# Patient Record
Sex: MALE | Race: BLACK OR AFRICAN AMERICAN | NOT HISPANIC OR LATINO | Employment: FULL TIME | URBAN - METROPOLITAN AREA
[De-identification: names, ages, dates, MRNs, and addresses within clinical notes are randomized per-mention and may not be internally consistent; named-entity substitution may affect disease eponyms.]

---

## 2022-06-30 ENCOUNTER — OFFICE VISIT (OUTPATIENT)
Dept: FAMILY MEDICINE | Facility: CLINIC | Age: 32
End: 2022-06-30
Payer: COMMERCIAL

## 2022-06-30 VITALS
TEMPERATURE: 98 F | HEIGHT: 71 IN | DIASTOLIC BLOOD PRESSURE: 86 MMHG | SYSTOLIC BLOOD PRESSURE: 125 MMHG | WEIGHT: 207 LBS | HEART RATE: 63 BPM | OXYGEN SATURATION: 98 % | BODY MASS INDEX: 28.98 KG/M2 | RESPIRATION RATE: 20 BRPM

## 2022-06-30 DIAGNOSIS — D17.0 LIPOMA OF SCALP: ICD-10-CM

## 2022-06-30 DIAGNOSIS — Z76.89 ENCOUNTER TO ESTABLISH CARE: Primary | ICD-10-CM

## 2022-06-30 DIAGNOSIS — E66.3 OVERWEIGHT (BMI 25.0-29.9): ICD-10-CM

## 2022-06-30 PROBLEM — Z13.220 LIPID SCREENING: Status: RESOLVED | Noted: 2022-06-30 | Resolved: 2022-06-30

## 2022-06-30 PROBLEM — Z13.220 LIPID SCREENING: Status: ACTIVE | Noted: 2022-06-30

## 2022-06-30 PROCEDURE — 1160F PR REVIEW ALL MEDS BY PRESCRIBER/CLIN PHARMACIST DOCUMENTED: ICD-10-PCS | Mod: CPTII,,, | Performed by: FAMILY MEDICINE

## 2022-06-30 PROCEDURE — 3008F BODY MASS INDEX DOCD: CPT | Mod: CPTII,,, | Performed by: FAMILY MEDICINE

## 2022-06-30 PROCEDURE — 99202 OFFICE O/P NEW SF 15 MIN: CPT | Mod: ,,, | Performed by: FAMILY MEDICINE

## 2022-06-30 PROCEDURE — 1159F MED LIST DOCD IN RCRD: CPT | Mod: CPTII,,, | Performed by: FAMILY MEDICINE

## 2022-06-30 PROCEDURE — 99202 PR OFFICE/OUTPT VISIT, NEW, LEVL II, 15-29 MIN: ICD-10-PCS | Mod: ,,, | Performed by: FAMILY MEDICINE

## 2022-06-30 PROCEDURE — 1159F PR MEDICATION LIST DOCUMENTED IN MEDICAL RECORD: ICD-10-PCS | Mod: CPTII,,, | Performed by: FAMILY MEDICINE

## 2022-06-30 PROCEDURE — 3074F SYST BP LT 130 MM HG: CPT | Mod: CPTII,,, | Performed by: FAMILY MEDICINE

## 2022-06-30 PROCEDURE — 3074F PR MOST RECENT SYSTOLIC BLOOD PRESSURE < 130 MM HG: ICD-10-PCS | Mod: CPTII,,, | Performed by: FAMILY MEDICINE

## 2022-06-30 PROCEDURE — 3008F PR BODY MASS INDEX (BMI) DOCUMENTED: ICD-10-PCS | Mod: CPTII,,, | Performed by: FAMILY MEDICINE

## 2022-06-30 PROCEDURE — 3079F PR MOST RECENT DIASTOLIC BLOOD PRESSURE 80-89 MM HG: ICD-10-PCS | Mod: CPTII,,, | Performed by: FAMILY MEDICINE

## 2022-06-30 PROCEDURE — 3079F DIAST BP 80-89 MM HG: CPT | Mod: CPTII,,, | Performed by: FAMILY MEDICINE

## 2022-06-30 PROCEDURE — 1160F RVW MEDS BY RX/DR IN RCRD: CPT | Mod: CPTII,,, | Performed by: FAMILY MEDICINE

## 2022-06-30 NOTE — PROGRESS NOTES
"Subjective:      Patient ID: Wei Welch is a 32 y.o. male.    Chief Complaint: Establish Care (Requesting labs. )    31 yo AAM here to establish care. Denies acute complaints. Would like to discuss age-appropriate screening and surveillance.       Problem List Items Addressed This Visit     BMI 28.0-28.9,adult    Overweight (BMI 25.0-29.9)      Other Visit Diagnoses     Encounter to establish care    -  Primary    Lipoma of scalp        Relevant Orders    Ambulatory referral/consult to Dermatology          The patient's Health Maintenance was reviewed and the following appears to be due:   Health Maintenance Due   Topic Date Due    Hepatitis C Screening  Never done    Lipid Panel  Never done    HIV Screening  Never done    COVID-19 Vaccine (3 - Booster for Pfizer series) 02/15/2022       Past Medical History:  History reviewed. No pertinent past medical history.  History reviewed. No pertinent surgical history.  Review of patient's allergies indicates:  No Known Allergies  No current outpatient medications on file prior to visit.     No current facility-administered medications on file prior to visit.     Social History     Socioeconomic History    Marital status:     Number of children: 1   Occupational History    Occupation:    Tobacco Use    Smoking status: Never Smoker    Smokeless tobacco: Never Used   Substance and Sexual Activity    Alcohol use: Yes     Alcohol/week: 3.0 standard drinks     Types: 1 Glasses of wine, 1 Cans of beer, 1 Shots of liquor per week    Drug use: Yes     Types: Marijuana     Family History   Problem Relation Age of Onset    No Known Problems Mother     No Known Problems Father        Review of Systems   All other systems reviewed and are negative.      Objective:   /86 (BP Location: Right arm, Patient Position: Sitting, BP Method: X-Large (Automatic))   Pulse 63   Temp 98.1 °F (36.7 °C) (Oral)   Resp 20   Ht 5' 11" (1.803 m)   Wt 93.9 " kg (207 lb)   SpO2 98%   BMI 28.87 kg/m²     Physical Exam  Vitals and nursing note reviewed.   Constitutional:       General: He is awake.      Appearance: Normal appearance. He is well-developed, well-groomed and overweight.   HENT:      Head: Normocephalic and atraumatic.      Right Ear: Tympanic membrane, ear canal and external ear normal.      Left Ear: Tympanic membrane, ear canal and external ear normal.      Nose: Nose normal.      Mouth/Throat:      Mouth: Mucous membranes are moist.      Pharynx: Oropharynx is clear.   Eyes:      Extraocular Movements: Extraocular movements intact.      Conjunctiva/sclera: Conjunctivae normal.      Pupils: Pupils are equal, round, and reactive to light.   Cardiovascular:      Rate and Rhythm: Normal rate and regular rhythm.      Pulses: Normal pulses.      Heart sounds: Normal heart sounds.   Pulmonary:      Effort: Pulmonary effort is normal.      Breath sounds: Normal breath sounds.   Abdominal:      General: Abdomen is flat. Bowel sounds are normal.      Palpations: Abdomen is soft.   Musculoskeletal:         General: Normal range of motion.      Cervical back: Normal range of motion and neck supple.   Skin:     General: Skin is warm and dry.      Capillary Refill: Capillary refill takes 2 to 3 seconds.   Neurological:      General: No focal deficit present.      Mental Status: He is alert and oriented to person, place, and time. Mental status is at baseline.   Psychiatric:         Mood and Affect: Mood normal.         Behavior: Behavior normal. Behavior is cooperative.         Thought Content: Thought content normal.         Judgment: Judgment normal.         No visits with results within 6 Month(s) from this visit.   Latest known visit with results is:   No results found for any previous visit.       No image results found.       Assessment:     1. Encounter to establish care    2. BMI 28.0-28.9,adult    3. Overweight (BMI 25.0-29.9)    4. Lipoma of scalp      Plan:    Wei Welch does not currently have medications on file.  Problem List Items Addressed This Visit     BMI 28.0-28.9,adult    Overweight (BMI 25.0-29.9)      Other Visit Diagnoses     Encounter to establish care    -  Primary    Lipoma of scalp        Relevant Orders    Ambulatory referral/consult to Dermatology        Follow up in about 1 year (around 6/30/2023), or or PRN.    Wei was seen today for establish care.    Diagnoses and all orders for this visit:    Encounter to establish care  USPSTF guidelines advise bi-annual BP checks in otherwise stable, healthy and unsymptomatic patients.  In 2 years (@35), a lipid screen  Otherwise, he is up to date per USPSTF guidelines    BMI 28.0-28.9,adult  Documented for chart completeness and HCC  Weight loss efforts advised    Overweight (BMI 25.0-29.9)   Documented for chart completeness and HCC   Weight loss efforts advised             [unfilled]  Orders Placed This Encounter   Procedures    Ambulatory referral/consult to Dermatology     Standing Status:   Future     Standing Expiration Date:   7/30/2023     Referral Priority:   Routine     Referral Type:   Consultation     Referral Reason:   Specialty Services Required     Requested Specialty:   Dermatology     Number of Visits Requested:   1

## 2023-07-07 ENCOUNTER — LAB VISIT (OUTPATIENT)
Dept: LAB | Facility: HOSPITAL | Age: 33
End: 2023-07-07
Attending: NURSE PRACTITIONER
Payer: COMMERCIAL

## 2023-07-07 ENCOUNTER — OFFICE VISIT (OUTPATIENT)
Dept: FAMILY MEDICINE | Facility: CLINIC | Age: 33
End: 2023-07-07
Payer: COMMERCIAL

## 2023-07-07 VITALS
SYSTOLIC BLOOD PRESSURE: 135 MMHG | BODY MASS INDEX: 29.49 KG/M2 | HEART RATE: 50 BPM | TEMPERATURE: 97 F | OXYGEN SATURATION: 99 % | DIASTOLIC BLOOD PRESSURE: 89 MMHG | WEIGHT: 210.63 LBS | HEIGHT: 71 IN | RESPIRATION RATE: 20 BRPM

## 2023-07-07 DIAGNOSIS — Z00.00 WELLNESS EXAMINATION: ICD-10-CM

## 2023-07-07 DIAGNOSIS — Z00.00 WELLNESS EXAMINATION: Primary | ICD-10-CM

## 2023-07-07 DIAGNOSIS — E66.3 OVERWEIGHT (BMI 25.0-29.9): ICD-10-CM

## 2023-07-07 LAB
ABS NEUT CALC (OHS): 0.88 X10(3)/MCL (ref 2.1–9.2)
ALBUMIN SERPL-MCNC: 4.1 G/DL (ref 3.5–5)
ALBUMIN/GLOB SERPL: 1.2 RATIO (ref 1.1–2)
ALP SERPL-CCNC: 53 UNIT/L (ref 40–150)
ALT SERPL-CCNC: 18 UNIT/L (ref 0–55)
APPEARANCE UR: CLEAR
AST SERPL-CCNC: 17 UNIT/L (ref 5–34)
BILIRUB UR QL STRIP.AUTO: NEGATIVE MG/DL
BILIRUBIN DIRECT+TOT PNL SERPL-MCNC: 0.6 MG/DL
BUN SERPL-MCNC: 19.3 MG/DL (ref 8.9–20.6)
CALCIUM SERPL-MCNC: 9.8 MG/DL (ref 8.4–10.2)
CHLORIDE SERPL-SCNC: 106 MMOL/L (ref 98–107)
CHOLEST SERPL-MCNC: 161 MG/DL
CHOLEST/HDLC SERPL: 3 {RATIO} (ref 0–5)
CO2 SERPL-SCNC: 29 MMOL/L (ref 22–29)
COLOR UR: NORMAL
CREAT SERPL-MCNC: 1.24 MG/DL (ref 0.73–1.18)
EOSINOPHIL NFR BLD MANUAL: 0.27 X10(3)/MCL (ref 0–0.9)
EOSINOPHIL NFR BLD MANUAL: 10 % (ref 0–8)
ERYTHROCYTE [DISTWIDTH] IN BLOOD BY AUTOMATED COUNT: 11.8 % (ref 11.5–17)
EST. AVERAGE GLUCOSE BLD GHB EST-MCNC: 73.8 MG/DL
GFR SERPLBLD CREATININE-BSD FMLA CKD-EPI: >60 MLS/MIN/1.73/M2
GLOBULIN SER-MCNC: 3.4 GM/DL (ref 2.4–3.5)
GLUCOSE SERPL-MCNC: 89 MG/DL (ref 74–100)
GLUCOSE UR QL STRIP.AUTO: NEGATIVE MG/DL
HBA1C MFR BLD: 4.2 %
HCT VFR BLD AUTO: 44 % (ref 42–52)
HCV AB SERPL QL IA: NONREACTIVE
HDLC SERPL-MCNC: 50 MG/DL (ref 35–60)
HGB BLD-MCNC: 15.3 G/DL (ref 14–18)
HIV 1+2 AB+HIV1 P24 AG SERPL QL IA: NONREACTIVE
KETONES UR QL STRIP.AUTO: NEGATIVE MG/DL
LDLC SERPL CALC-MCNC: 91 MG/DL (ref 50–140)
LEUKOCYTE ESTERASE UR QL STRIP.AUTO: NEGATIVE UNIT/L
LYMPHOCYTES NFR BLD MANUAL: 1.39 X10(3)/MCL
LYMPHOCYTES NFR BLD MANUAL: 52 % (ref 13–40)
MCH RBC QN AUTO: 31 PG (ref 27–31)
MCHC RBC AUTO-ENTMCNC: 34.8 G/DL (ref 33–36)
MCV RBC AUTO: 89.2 FL (ref 80–94)
MONOCYTES NFR BLD MANUAL: 0.13 X10(3)/MCL (ref 0.1–1.3)
MONOCYTES NFR BLD MANUAL: 5 % (ref 2–11)
NEUTROPHILS NFR BLD MANUAL: 33 % (ref 47–80)
NITRITE UR QL STRIP.AUTO: NEGATIVE
NRBC BLD AUTO-RTO: 0 %
PH UR STRIP.AUTO: 6.5 [PH]
PLATELET # BLD AUTO: 148 X10(3)/MCL (ref 130–400)
PLATELET # BLD EST: NORMAL 10*3/UL
PMV BLD AUTO: 11.8 FL (ref 7.4–10.4)
POTASSIUM SERPL-SCNC: 4.6 MMOL/L (ref 3.5–5.1)
PROT SERPL-MCNC: 7.5 GM/DL (ref 6.4–8.3)
PROT UR QL STRIP.AUTO: NEGATIVE MG/DL
RBC # BLD AUTO: 4.93 X10(6)/MCL (ref 4.7–6.1)
RBC MORPH BLD: NORMAL
RBC UR QL AUTO: NEGATIVE UNIT/L
SODIUM SERPL-SCNC: 142 MMOL/L (ref 136–145)
SP GR UR STRIP.AUTO: 1.02
TRIGL SERPL-MCNC: 98 MG/DL (ref 34–140)
TSH SERPL-ACNC: 1.88 UIU/ML (ref 0.35–4.94)
UROBILINOGEN UR STRIP-ACNC: 1 MG/DL
VLDLC SERPL CALC-MCNC: 20 MG/DL
WBC # SPEC AUTO: 2.67 X10(3)/MCL (ref 4.5–11.5)

## 2023-07-07 PROCEDURE — 87389 HIV-1 AG W/HIV-1&-2 AB AG IA: CPT

## 2023-07-07 PROCEDURE — 36415 COLL VENOUS BLD VENIPUNCTURE: CPT

## 2023-07-07 PROCEDURE — 1159F PR MEDICATION LIST DOCUMENTED IN MEDICAL RECORD: ICD-10-PCS | Mod: CPTII,,, | Performed by: NURSE PRACTITIONER

## 2023-07-07 PROCEDURE — 99395 PR PREVENTIVE VISIT,EST,18-39: ICD-10-PCS | Mod: ,,, | Performed by: NURSE PRACTITIONER

## 2023-07-07 PROCEDURE — 3008F PR BODY MASS INDEX (BMI) DOCUMENTED: ICD-10-PCS | Mod: CPTII,,, | Performed by: NURSE PRACTITIONER

## 2023-07-07 PROCEDURE — 84443 ASSAY THYROID STIM HORMONE: CPT

## 2023-07-07 PROCEDURE — 80053 COMPREHEN METABOLIC PANEL: CPT

## 2023-07-07 PROCEDURE — 3075F SYST BP GE 130 - 139MM HG: CPT | Mod: CPTII,,, | Performed by: NURSE PRACTITIONER

## 2023-07-07 PROCEDURE — 1160F PR REVIEW ALL MEDS BY PRESCRIBER/CLIN PHARMACIST DOCUMENTED: ICD-10-PCS | Mod: CPTII,,, | Performed by: NURSE PRACTITIONER

## 2023-07-07 PROCEDURE — 80061 LIPID PANEL: CPT

## 2023-07-07 PROCEDURE — 1160F RVW MEDS BY RX/DR IN RCRD: CPT | Mod: CPTII,,, | Performed by: NURSE PRACTITIONER

## 2023-07-07 PROCEDURE — 3079F PR MOST RECENT DIASTOLIC BLOOD PRESSURE 80-89 MM HG: ICD-10-PCS | Mod: CPTII,,, | Performed by: NURSE PRACTITIONER

## 2023-07-07 PROCEDURE — 83036 HEMOGLOBIN GLYCOSYLATED A1C: CPT

## 2023-07-07 PROCEDURE — 3075F PR MOST RECENT SYSTOLIC BLOOD PRESS GE 130-139MM HG: ICD-10-PCS | Mod: CPTII,,, | Performed by: NURSE PRACTITIONER

## 2023-07-07 PROCEDURE — 99395 PREV VISIT EST AGE 18-39: CPT | Mod: ,,, | Performed by: NURSE PRACTITIONER

## 2023-07-07 PROCEDURE — 3079F DIAST BP 80-89 MM HG: CPT | Mod: CPTII,,, | Performed by: NURSE PRACTITIONER

## 2023-07-07 PROCEDURE — 3008F BODY MASS INDEX DOCD: CPT | Mod: CPTII,,, | Performed by: NURSE PRACTITIONER

## 2023-07-07 PROCEDURE — 85027 COMPLETE CBC AUTOMATED: CPT

## 2023-07-07 PROCEDURE — 1159F MED LIST DOCD IN RCRD: CPT | Mod: CPTII,,, | Performed by: NURSE PRACTITIONER

## 2023-07-07 PROCEDURE — 86803 HEPATITIS C AB TEST: CPT

## 2023-07-07 NOTE — PROGRESS NOTES
Subjective:      Patient ID: Wei Welch is a 33 y.o. Black or  male      Chief Complaint: 1 year f/u and Annual Exam       History reviewed. No pertinent past medical history.     HPI  Presents to clinic for Annual Wellness exam.  Denies any acute problems.    Labs due and ordered   Tdap up-to-date (2022)   Patient exercises 4-5 times a week for 1 hour per day  Do        Review of Systems   Constitutional:  Negative for chills, fatigue, fever and unexpected weight change.   HENT: Negative.     Eyes: Negative.    Respiratory: Negative.  Negative for shortness of breath.    Cardiovascular: Negative.  Negative for chest pain.   Gastrointestinal: Negative.    Endocrine: Negative.    Genitourinary: Negative.    Musculoskeletal: Negative.    Integumentary:  Negative.   Allergic/Immunologic: Negative.    Neurological: Negative.  Negative for weakness.   Hematological: Negative.    Psychiatric/Behavioral: Negative.     All other systems reviewed and are negative.       Objective:      Vitals:    07/07/23 0757   BP: 135/89   Pulse: (!) 50   Resp: 20   Temp: 97 °F (36.1 °C)      Body mass index is 29.37 kg/m².     Physical Exam  Vitals reviewed.   Constitutional:       Appearance: He is not toxic-appearing.   HENT:      Head: Normocephalic.      Mouth/Throat:      Mouth: Mucous membranes are moist.   Eyes:      Extraocular Movements: Extraocular movements intact.      Pupils: Pupils are equal, round, and reactive to light.   Cardiovascular:      Rate and Rhythm: Normal rate and regular rhythm.      Pulses: Normal pulses.      Heart sounds: Normal heart sounds.   Pulmonary:      Effort: Pulmonary effort is normal. No respiratory distress.      Breath sounds: Normal breath sounds.   Abdominal:      General: Bowel sounds are normal. There is no distension.      Palpations: Abdomen is soft.      Tenderness: There is no abdominal tenderness.   Musculoskeletal:         General: No tenderness. Normal range of  motion.      Cervical back: Neck supple.   Skin:     General: Skin is warm and dry.   Neurological:      Mental Status: He is alert and oriented to person, place, and time.   Psychiatric:         Mood and Affect: Mood normal.        No current outpatient medications on file.    Assessment & Plan:     Problem List Items Addressed This Visit          Endocrine    Overweight (BMI 25.0-29.9)     Instructed to continue aerobic exercises              Other    Wellness examination - Primary     Labs due and ordered   Tdap up-to-date (2022)   Instructed to continue aerobic exercises         Relevant Orders    CBC Auto Differential    Comprehensive Metabolic Panel    Lipid Panel    TSH    Hemoglobin A1C    Urinalysis    Hepatitis C Antibody    HIV 1/2 Ag/Ab (4th Gen)

## 2023-07-10 DIAGNOSIS — D72.819 LEUKOPENIA, UNSPECIFIED TYPE: Primary | ICD-10-CM

## 2023-10-09 PROBLEM — Z00.00 WELLNESS EXAMINATION: Status: RESOLVED | Noted: 2023-07-07 | Resolved: 2023-10-09

## 2024-07-09 ENCOUNTER — OFFICE VISIT (OUTPATIENT)
Dept: FAMILY MEDICINE | Facility: CLINIC | Age: 34
End: 2024-07-09
Payer: COMMERCIAL

## 2024-07-09 ENCOUNTER — LAB VISIT (OUTPATIENT)
Dept: LAB | Facility: HOSPITAL | Age: 34
End: 2024-07-09
Attending: FAMILY MEDICINE
Payer: COMMERCIAL

## 2024-07-09 VITALS
HEIGHT: 71 IN | DIASTOLIC BLOOD PRESSURE: 78 MMHG | SYSTOLIC BLOOD PRESSURE: 110 MMHG | OXYGEN SATURATION: 98 % | RESPIRATION RATE: 16 BRPM | HEART RATE: 63 BPM | WEIGHT: 198.5 LBS | TEMPERATURE: 98 F | BODY MASS INDEX: 27.79 KG/M2

## 2024-07-09 DIAGNOSIS — E66.3 OVERWEIGHT (BMI 25.0-29.9): ICD-10-CM

## 2024-07-09 DIAGNOSIS — Z00.00 WELLNESS EXAMINATION: Primary | ICD-10-CM

## 2024-07-09 DIAGNOSIS — R10.30 LOWER ABDOMINAL PAIN: ICD-10-CM

## 2024-07-09 DIAGNOSIS — Z00.00 WELLNESS EXAMINATION: ICD-10-CM

## 2024-07-09 DIAGNOSIS — D72.819 LEUKOPENIA, UNSPECIFIED TYPE: Primary | ICD-10-CM

## 2024-07-09 LAB
ABS NEUT CALC (OHS): 1 X10(3)/MCL (ref 2.1–9.2)
ALBUMIN SERPL-MCNC: 4.1 G/DL (ref 3.5–5)
ALBUMIN/GLOB SERPL: 1.2 RATIO (ref 1.1–2)
ALP SERPL-CCNC: 60 UNIT/L (ref 40–150)
ALT SERPL-CCNC: 17 UNIT/L (ref 0–55)
ANION GAP SERPL CALC-SCNC: 7 MEQ/L
AST SERPL-CCNC: 18 UNIT/L (ref 5–34)
BILIRUB SERPL-MCNC: 0.9 MG/DL
BILIRUB UR QL STRIP.AUTO: NEGATIVE
BUN SERPL-MCNC: 24.7 MG/DL (ref 8.9–20.6)
CALCIUM SERPL-MCNC: 9.6 MG/DL (ref 8.4–10.2)
CHLORIDE SERPL-SCNC: 109 MMOL/L (ref 98–107)
CHOLEST SERPL-MCNC: 172 MG/DL
CHOLEST/HDLC SERPL: 3 {RATIO} (ref 0–5)
CLARITY UR: CLEAR
CO2 SERPL-SCNC: 26 MMOL/L (ref 22–29)
COLOR UR AUTO: ABNORMAL
CREAT SERPL-MCNC: 1.04 MG/DL (ref 0.73–1.18)
CREAT/UREA NIT SERPL: 24
EOSINOPHIL NFR BLD MANUAL: 0.03 X10(3)/MCL (ref 0–0.9)
EOSINOPHIL NFR BLD MANUAL: 1 % (ref 0–8)
ERYTHROCYTE [DISTWIDTH] IN BLOOD BY AUTOMATED COUNT: 11.6 % (ref 11.5–17)
EST. AVERAGE GLUCOSE BLD GHB EST-MCNC: 79.6 MG/DL
GFR SERPLBLD CREATININE-BSD FMLA CKD-EPI: >60 ML/MIN/1.73/M2
GLOBULIN SER-MCNC: 3.5 GM/DL (ref 2.4–3.5)
GLUCOSE SERPL-MCNC: 88 MG/DL (ref 74–100)
GLUCOSE UR QL STRIP: NEGATIVE
HBA1C MFR BLD: 4.4 %
HCT VFR BLD AUTO: 43 % (ref 42–52)
HDLC SERPL-MCNC: 51 MG/DL (ref 35–60)
HGB BLD-MCNC: 14.7 G/DL (ref 14–18)
HGB UR QL STRIP: NEGATIVE
KETONES UR QL STRIP: NEGATIVE
LDLC SERPL CALC-MCNC: 107 MG/DL (ref 50–140)
LEUKOCYTE ESTERASE UR QL STRIP: NEGATIVE
LYMPHOCYTES NFR BLD MANUAL: 1.44 X10(3)/MCL
LYMPHOCYTES NFR BLD MANUAL: 53 % (ref 13–40)
MCH RBC QN AUTO: 30.3 PG (ref 27–31)
MCHC RBC AUTO-ENTMCNC: 34.2 G/DL (ref 33–36)
MCV RBC AUTO: 88.7 FL (ref 80–94)
MONOCYTES NFR BLD MANUAL: 0.24 X10(3)/MCL (ref 0.1–1.3)
MONOCYTES NFR BLD MANUAL: 9 % (ref 2–11)
NEUTROPHILS NFR BLD MANUAL: 37 % (ref 47–80)
NITRITE UR QL STRIP: NEGATIVE
NRBC BLD AUTO-RTO: 0 %
PH UR STRIP: 6 [PH]
PLATELET # BLD AUTO: 183 X10(3)/MCL (ref 130–400)
PLATELET # BLD EST: ADEQUATE 10*3/UL
PMV BLD AUTO: 12 FL (ref 7.4–10.4)
POTASSIUM SERPL-SCNC: 3.8 MMOL/L (ref 3.5–5.1)
PROT SERPL-MCNC: 7.6 GM/DL (ref 6.4–8.3)
PROT UR QL STRIP: NEGATIVE
RBC # BLD AUTO: 4.85 X10(6)/MCL (ref 4.7–6.1)
RBC MORPH BLD: NORMAL
SODIUM SERPL-SCNC: 142 MMOL/L (ref 136–145)
SP GR UR STRIP.AUTO: >=1.03 (ref 1–1.03)
TRIGL SERPL-MCNC: 69 MG/DL (ref 34–140)
TSH SERPL-ACNC: 1.66 UIU/ML (ref 0.35–4.94)
UROBILINOGEN UR STRIP-ACNC: 2
VLDLC SERPL CALC-MCNC: 14 MG/DL
WBC # BLD AUTO: 2.71 X10(3)/MCL (ref 4.5–11.5)

## 2024-07-09 PROCEDURE — 85025 COMPLETE CBC W/AUTO DIFF WBC: CPT

## 2024-07-09 PROCEDURE — 83036 HEMOGLOBIN GLYCOSYLATED A1C: CPT

## 2024-07-09 PROCEDURE — 36415 COLL VENOUS BLD VENIPUNCTURE: CPT

## 2024-07-09 PROCEDURE — 3078F DIAST BP <80 MM HG: CPT | Mod: CPTII,,, | Performed by: FAMILY MEDICINE

## 2024-07-09 PROCEDURE — 80061 LIPID PANEL: CPT

## 2024-07-09 PROCEDURE — 1159F MED LIST DOCD IN RCRD: CPT | Mod: CPTII,,, | Performed by: FAMILY MEDICINE

## 2024-07-09 PROCEDURE — 3074F SYST BP LT 130 MM HG: CPT | Mod: CPTII,,, | Performed by: FAMILY MEDICINE

## 2024-07-09 PROCEDURE — 1160F RVW MEDS BY RX/DR IN RCRD: CPT | Mod: CPTII,,, | Performed by: FAMILY MEDICINE

## 2024-07-09 PROCEDURE — 3008F BODY MASS INDEX DOCD: CPT | Mod: CPTII,,, | Performed by: FAMILY MEDICINE

## 2024-07-09 PROCEDURE — 80053 COMPREHEN METABOLIC PANEL: CPT

## 2024-07-09 PROCEDURE — 84443 ASSAY THYROID STIM HORMONE: CPT

## 2024-07-09 PROCEDURE — 99395 PREV VISIT EST AGE 18-39: CPT | Mod: ,,, | Performed by: FAMILY MEDICINE

## 2024-07-09 NOTE — PROGRESS NOTES
"Subjective:        Patient ID: Wei Welch is a 34 y.o. male.    Chief Complaint: Establish Care (Former [place] patient est care/Wellness )      Patient presents to clinic unaccompanied to establish care. Previously saw dr. Singh.  Is due for a wellness visit and labs    C/o abd pain on RLQ.  Intermittent. 1-2/10.  Resolves on its own. Lasts only a few seconds. Sharp pain. No bulge/swelling.  BM regular. No fever. No change in appetite.     He does not take any prescriptions on a regular basis.  denies any past medical history. Denies any surgical history.  Family history of prostate cancer in paternal uncles.     He is not allergic to any medications.  He does not smoke. He exercises regularly      Review of Systems   Constitutional: Negative.    HENT: Negative.     Eyes: Negative.    Respiratory: Negative.     Cardiovascular: Negative.    Gastrointestinal: Negative.    Endocrine: Negative.    Genitourinary: Negative.    Musculoskeletal: Negative.    Skin: Negative.    Allergic/Immunologic: Negative.    Neurological: Negative.    Hematological: Negative.    Psychiatric/Behavioral: Negative.     All other systems reviewed and are negative.        Review of patient's allergies indicates:  No Known Allergies   Vitals:    07/09/24 0806   BP: 110/78   BP Location: Left arm   Pulse: 63   Resp: 16   Temp: 97.8 °F (36.6 °C)   TempSrc: Temporal   SpO2: 98%   Weight: 90 kg (198 lb 8 oz)   Height: 5' 11" (1.803 m)      Social History     Socioeconomic History    Marital status:     Number of children: 1   Occupational History    Occupation:    Tobacco Use    Smoking status: Never    Smokeless tobacco: Never   Substance and Sexual Activity    Alcohol use: Yes     Alcohol/week: 3.0 standard drinks of alcohol     Types: 1 Glasses of wine, 1 Cans of beer, 1 Shots of liquor per week     Comment: 2-3 times a week    Drug use: Yes     Types: Marijuana      Family History   Problem Relation Name Age of " Onset    No Known Problems Mother      No Known Problems Father            Objective:     Physical Exam  Vitals and nursing note reviewed.   Constitutional:       Appearance: Normal appearance.   HENT:      Head: Normocephalic.      Nose: Nose normal.      Mouth/Throat:      Mouth: Mucous membranes are moist.      Pharynx: Oropharynx is clear.   Eyes:      Extraocular Movements: Extraocular movements intact.   Cardiovascular:      Rate and Rhythm: Normal rate and regular rhythm.   Pulmonary:      Effort: Pulmonary effort is normal.      Breath sounds: Normal breath sounds.   Musculoskeletal:         General: Normal range of motion.   Skin:     General: Skin is warm and dry.   Neurological:      General: No focal deficit present.      Mental Status: He is alert and oriented to person, place, and time. Mental status is at baseline.   Psychiatric:         Mood and Affect: Mood normal.       No current outpatient medications on file prior to visit.     No current facility-administered medications on file prior to visit.     Health Maintenance   Topic Date Due    Lipid Panel  07/07/2028    TETANUS VACCINE  02/04/2034    Hepatitis C Screening  Completed      Results for orders placed or performed in visit on 07/07/23   Comprehensive Metabolic Panel   Result Value Ref Range    Sodium 142 136 - 145 mmol/L    Potassium 4.6 3.5 - 5.1 mmol/L    Chloride 106 98 - 107 mmol/L    CO2 29 22 - 29 mmol/L    Glucose 89 74 - 100 mg/dL    Blood Urea Nitrogen 19.3 8.9 - 20.6 mg/dL    Creatinine 1.24 (H) 0.73 - 1.18 mg/dL    Calcium 9.8 8.4 - 10.2 mg/dL    Protein Total 7.5 6.4 - 8.3 gm/dL    Albumin 4.1 3.5 - 5.0 g/dL    Globulin 3.4 2.4 - 3.5 gm/dL    Albumin/Globulin Ratio 1.2 1.1 - 2.0 ratio    Bilirubin Total 0.6 <=1.5 mg/dL    ALP 53 40 - 150 unit/L    ALT 18 0 - 55 unit/L    AST 17 5 - 34 unit/L    eGFR >60 mls/min/1.73/m2   Lipid Panel   Result Value Ref Range    Cholesterol Total 161 <=200 mg/dL    HDL Cholesterol 50 35 - 60  mg/dL    Triglyceride 98 34 - 140 mg/dL    Cholesterol/HDL Ratio 3 0 - 5    Very Low Density Lipoprotein 20     LDL Cholesterol 91.00 50.00 - 140.00 mg/dL   TSH   Result Value Ref Range    TSH 1.881 0.350 - 4.940 uIU/mL   Hemoglobin A1C   Result Value Ref Range    Hemoglobin A1c 4.2 <=7.0 %    Estimated Average Glucose 73.8 mg/dL   Hepatitis C Antibody   Result Value Ref Range    Hep C Ab Interp Nonreactive Nonreactive   HIV 1/2 Ag/Ab (4th Gen)   Result Value Ref Range    HIV Nonreactive Nonreactive   CBC with Differential   Result Value Ref Range    WBC 2.67 (L) 4.50 - 11.50 x10(3)/mcL    RBC 4.93 4.70 - 6.10 x10(6)/mcL    Hgb 15.3 14.0 - 18.0 g/dL    Hct 44.0 42.0 - 52.0 %    MCV 89.2 80.0 - 94.0 fL    MCH 31.0 27.0 - 31.0 pg    MCHC 34.8 33.0 - 36.0 g/dL    RDW 11.8 11.5 - 17.0 %    Platelet 148 130 - 400 x10(3)/mcL    MPV 11.8 (H) 7.4 - 10.4 fL    NRBC% 0.0 %   Manual Differential   Result Value Ref Range    Neutrophils % 33 (L) 47 - 80 %    Lymphs % 52 (H) 13 - 40 %    Monocytes % 5 2 - 11 %    Eosinophils % 10 (H) 0 - 8 %    Neutrophils Abs Calc 0.8811 (L) 2.1 - 9.2 x10(3)/mcL    Lymphs Abs 1.3884 0.6 - 4.6 x10(3)/mcL    Eosinophils Abs 0.267 0 - 0.9 x10(3)/mcL    Monocytes Abs 0.1335 0.1 - 1.3 x10(3)/mcL    Platelets Normal Normal, Adequate    RBC Morph Normal Normal          Assessment & Plan:     Active Problem List with Overview Notes    Diagnosis Date Noted    Lower abdominal pain 07/09/2024    Wellness examination 07/07/2023    Overweight (BMI 25.0-29.9) 06/30/2022       1. Wellness examination  Assessment & Plan:  Fasting labs ordered. Will call with results when available    Orders:  -     CBC Auto Differential; Future; Expected date: 07/09/2024  -     Comprehensive Metabolic Panel; Future; Expected date: 07/09/2024  -     Lipid Panel; Future; Expected date: 07/09/2024  -     TSH; Future; Expected date: 07/09/2024  -     Hemoglobin A1C; Future; Expected date: 07/09/2024  -     Urinalysis; Future;  Expected date: 07/09/2024    2. Overweight (BMI 25.0-29.9)  Assessment & Plan:  Encouraged lifestyle change    Orders:  -     CBC Auto Differential; Future; Expected date: 07/09/2024  -     Comprehensive Metabolic Panel; Future; Expected date: 07/09/2024  -     Lipid Panel; Future; Expected date: 07/09/2024  -     TSH; Future; Expected date: 07/09/2024  -     Hemoglobin A1C; Future; Expected date: 07/09/2024  -     Urinalysis; Future; Expected date: 07/09/2024    3. Lower abdominal pain  Assessment & Plan:  Likely not hernia.  Constipation? Monitor symptoms. Contact clinic for concerns. Patient is agreeable to plan and verbalized understanding           Follow up in about 1 year (around 7/9/2025) for Wellness with Labs.

## 2024-07-09 NOTE — PROGRESS NOTES
Please inform patient of results.    1. Bun is elevated. Other renal fx wnl.  Encourage fluids. Monitor  2. Wbc is still low but stable. No anemia. Platelet count wnl. I would like to refer to heme for further work up. Referral placed.     Other labwork within acceptable ranges.

## 2024-07-12 NOTE — PROGRESS NOTES
Subjective:       Patient ID: Wei Welch is a 34 y.o. male.    Chief Complaint: New Patient    Diagnosis: Leukopenia    Current Treatment: None    Treatment History: N/A    HPI:   35 yo M presents in July '24 for evaluation of leukopenia. On 2 labs, one in July '23 and another in July '24 patient was found on annual visit with PCP to have asymptomatic leukopenia with WBC 2.6 and 2.7.  and 1000 respectively. Hemoglobin and platelets were normal. CMP unremarkable. He presents to hematology for further evaluation.     Today he has no complaints. He denies any smoking or heavy alcohol use. He eats a normal diet without excessive food restriction. Does not take any medications. Ranjan any history of frequent infections, hospitalizations, or antibiotic use. Denies any fevers, chills, NS, unintentional weight loss, fatigue.     I discussed his CBC findings and how they differ from expected values. We discussed the possible etiology for his findings including both benign and malignant causes. I explained the role for initial evaluation with blood work and possibly secondary evaluation with a bone marrow biopsy. All questions were answered at the time of the visit. He is agreeable to proceed with the plan.        History reviewed. No pertinent past medical history.   History reviewed. No pertinent surgical history.  Social History     Socioeconomic History    Marital status:     Number of children: 1   Occupational History    Occupation:    Tobacco Use    Smoking status: Never    Smokeless tobacco: Never   Substance and Sexual Activity    Alcohol use: Yes     Alcohol/week: 3.0 standard drinks of alcohol     Types: 1 Glasses of wine, 1 Cans of beer, 1 Shots of liquor per week     Comment: 2-3 times a week    Drug use: Yes     Types: Marijuana      Family History   Problem Relation Name Age of Onset    Mental illness Mother Avni Rueda     No Known Problems Father      Vision loss Maternal  Grandmother Sherrell Welch       Review of patient's allergies indicates:  No Known Allergies   Review of Systems   Constitutional:  Negative for activity change, appetite change, chills, fatigue, fever and unexpected weight change.   HENT:  Negative for mouth sores and sore throat.    Eyes:  Negative for visual disturbance.   Respiratory:  Negative for cough and shortness of breath.    Cardiovascular:  Negative for chest pain.   Gastrointestinal:  Negative for abdominal pain, constipation, diarrhea, nausea and vomiting.   Endocrine: Negative for polyuria.   Genitourinary:  Negative for dysuria and frequency.   Musculoskeletal:  Negative for back pain.   Integumentary:  Negative for rash.   Allergic/Immunologic: Negative for frequent infections.   Neurological:  Negative for weakness and headaches.   Hematological:  Negative for adenopathy. Does not bruise/bleed easily.   Psychiatric/Behavioral:  The patient is not nervous/anxious.          Objective:      Vitals:    07/17/24 1337   BP: 130/84   Pulse: 62   Resp: 18   Temp: 97.8 °F (36.6 °C)       Physical Exam  Constitutional:       General: He is not in acute distress.     Appearance: Normal appearance. He is not ill-appearing.   HENT:      Head: Normocephalic and atraumatic.      Nose: Nose normal.      Mouth/Throat:      Mouth: Mucous membranes are moist.      Pharynx: Oropharynx is clear.   Eyes:      Extraocular Movements: Extraocular movements intact.      Conjunctiva/sclera: Conjunctivae normal.      Pupils: Pupils are equal, round, and reactive to light.   Cardiovascular:      Rate and Rhythm: Normal rate and regular rhythm.      Pulses: Normal pulses.      Heart sounds: Normal heart sounds. No murmur heard.  Pulmonary:      Effort: Pulmonary effort is normal. No respiratory distress.      Breath sounds: Normal breath sounds.   Abdominal:      General: There is no distension.      Palpations: Abdomen is soft.      Tenderness: There is no abdominal  tenderness.   Musculoskeletal:         General: Normal range of motion.      Cervical back: Normal range of motion and neck supple.      Right lower leg: No edema.      Left lower leg: No edema.   Lymphadenopathy:      Cervical: No cervical adenopathy.   Skin:     General: Skin is warm and dry.   Neurological:      General: No focal deficit present.      Mental Status: He is alert and oriented to person, place, and time.         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 Will rule out other etiologies though.        Plan:   - Labs today  - RTC 3 weeks for MD visit, labs same day    I spent a total of 55 minutes on the day of the visit.This includes face to face time and non-face to face time preparing to see the patient (eg, review of tests), obtaining and/or reviewing separately obtained history, documenting clinical information in the electronic or other health record, independently interpreting results and communicating results to the patient/family/caregiver, or care coordinator.      Elizabeth Kennedy LeJeune, MD  Hematology/Oncology   [facility]        Professional Services   I, Cheri Carballo LPN, acted solely as a scribe for and in the presence of Dr. Elizabeth Kennedy LeJeune, who performed these services.

## 2024-07-17 ENCOUNTER — OFFICE VISIT (OUTPATIENT)
Dept: HEMATOLOGY/ONCOLOGY | Facility: CLINIC | Age: 34
End: 2024-07-17
Payer: COMMERCIAL

## 2024-07-17 ENCOUNTER — TELEPHONE (OUTPATIENT)
Dept: HEMATOLOGY/ONCOLOGY | Facility: CLINIC | Age: 34
End: 2024-07-17

## 2024-07-17 VITALS
WEIGHT: 206.19 LBS | BODY MASS INDEX: 28.87 KG/M2 | HEART RATE: 62 BPM | DIASTOLIC BLOOD PRESSURE: 84 MMHG | SYSTOLIC BLOOD PRESSURE: 130 MMHG | OXYGEN SATURATION: 100 % | HEIGHT: 71 IN | TEMPERATURE: 98 F | RESPIRATION RATE: 18 BRPM

## 2024-07-17 DIAGNOSIS — D72.819 LEUKOPENIA, UNSPECIFIED TYPE: ICD-10-CM

## 2024-07-17 LAB
ALBUMIN SERPL-MCNC: 4.1 G/DL (ref 3.5–5)
ALBUMIN/GLOB SERPL: 1.4 RATIO (ref 1.1–2)
ALP SERPL-CCNC: 61 UNIT/L (ref 40–150)
ALT SERPL-CCNC: 14 UNIT/L (ref 0–55)
ANION GAP SERPL CALC-SCNC: 10 MEQ/L
AST SERPL-CCNC: 14 UNIT/L (ref 5–34)
BASOPHILS # BLD AUTO: 0.03 X10(3)/MCL
BASOPHILS NFR BLD AUTO: 0.9 %
BILIRUB SERPL-MCNC: 0.6 MG/DL
BUN SERPL-MCNC: 25.1 MG/DL (ref 8.9–20.6)
CALCIUM SERPL-MCNC: 9.5 MG/DL (ref 8.4–10.2)
CHLORIDE SERPL-SCNC: 106 MMOL/L (ref 98–107)
CO2 SERPL-SCNC: 28 MMOL/L (ref 22–29)
CREAT SERPL-MCNC: 1.02 MG/DL (ref 0.73–1.18)
CREAT/UREA NIT SERPL: 25
EOSINOPHIL # BLD AUTO: 0.16 X10(3)/MCL (ref 0–0.9)
EOSINOPHIL NFR BLD AUTO: 4.8 %
ERYTHROCYTE [DISTWIDTH] IN BLOOD BY AUTOMATED COUNT: 11.5 % (ref 11.5–17)
FERRITIN SERPL-MCNC: 181.66 NG/ML (ref 21.81–274.66)
FOLATE SERPL-MCNC: 10.3 NG/ML (ref 7–31.4)
GFR SERPLBLD CREATININE-BSD FMLA CKD-EPI: >60 ML/MIN/1.73/M2
GLOBULIN SER-MCNC: 2.9 GM/DL (ref 2.4–3.5)
GLUCOSE SERPL-MCNC: 68 MG/DL (ref 74–100)
HCT VFR BLD AUTO: 40.4 % (ref 42–52)
HGB BLD-MCNC: 13.9 G/DL (ref 14–18)
IGA SERPL-MCNC: 291 MG/DL (ref 63–484)
IGG SERPL-MCNC: 1338 MG/DL (ref 540–1822)
IGM SERPL-MCNC: 116 MG/DL (ref 22–240)
IMM GRANULOCYTES # BLD AUTO: 0 X10(3)/MCL (ref 0–0.04)
IMM GRANULOCYTES NFR BLD AUTO: 0 %
IRON SATN MFR SERPL: 37 % (ref 20–50)
IRON SERPL-MCNC: 97 UG/DL (ref 65–175)
LDH SERPL-CCNC: 145 U/L (ref 125–220)
LYMPHOCYTES # BLD AUTO: 1.43 X10(3)/MCL (ref 0.6–4.6)
LYMPHOCYTES NFR BLD AUTO: 43.1 %
MCH RBC QN AUTO: 31.4 PG (ref 27–31)
MCHC RBC AUTO-ENTMCNC: 34.4 G/DL (ref 33–36)
MCV RBC AUTO: 91.2 FL (ref 80–94)
MONOCYTES # BLD AUTO: 0.23 X10(3)/MCL (ref 0.1–1.3)
MONOCYTES NFR BLD AUTO: 6.9 %
NEUTROPHILS # BLD AUTO: 1.47 X10(3)/MCL (ref 2.1–9.2)
NEUTROPHILS NFR BLD AUTO: 44.3 %
PLATELET # BLD AUTO: 148 X10(3)/MCL (ref 130–400)
PMV BLD AUTO: 11.8 FL (ref 7.4–10.4)
POTASSIUM SERPL-SCNC: 4.2 MMOL/L (ref 3.5–5.1)
PROT SERPL-MCNC: 7 GM/DL (ref 6.4–8.3)
RBC # BLD AUTO: 4.43 X10(6)/MCL (ref 4.7–6.1)
RET# (OHS): 0.08 X10E6/UL (ref 0.03–0.1)
RETICULOCYTE COUNT AUTOMATED (OLG): 1.78 % (ref 1.1–2.1)
SODIUM SERPL-SCNC: 144 MMOL/L (ref 136–145)
TIBC SERPL-MCNC: 167 UG/DL (ref 69–240)
TIBC SERPL-MCNC: 264 UG/DL (ref 250–450)
TRANSFERRIN SERPL-MCNC: 237 MG/DL (ref 174–364)
TSH SERPL-ACNC: 1.01 UIU/ML (ref 0.35–4.94)
VIT B12 SERPL-MCNC: 329 PG/ML (ref 213–816)
WBC # BLD AUTO: 3.32 X10(3)/MCL (ref 4.5–11.5)

## 2024-07-17 PROCEDURE — 83550 IRON BINDING TEST: CPT | Performed by: STUDENT IN AN ORGANIZED HEALTH CARE EDUCATION/TRAINING PROGRAM

## 2024-07-17 PROCEDURE — 99204 OFFICE O/P NEW MOD 45 MIN: CPT | Mod: S$GLB,,, | Performed by: STUDENT IN AN ORGANIZED HEALTH CARE EDUCATION/TRAINING PROGRAM

## 2024-07-17 PROCEDURE — 1159F MED LIST DOCD IN RCRD: CPT | Mod: CPTII,S$GLB,, | Performed by: STUDENT IN AN ORGANIZED HEALTH CARE EDUCATION/TRAINING PROGRAM

## 2024-07-17 PROCEDURE — 3079F DIAST BP 80-89 MM HG: CPT | Mod: CPTII,S$GLB,, | Performed by: STUDENT IN AN ORGANIZED HEALTH CARE EDUCATION/TRAINING PROGRAM

## 2024-07-17 PROCEDURE — 3075F SYST BP GE 130 - 139MM HG: CPT | Mod: CPTII,S$GLB,, | Performed by: STUDENT IN AN ORGANIZED HEALTH CARE EDUCATION/TRAINING PROGRAM

## 2024-07-17 PROCEDURE — 1160F RVW MEDS BY RX/DR IN RCRD: CPT | Mod: CPTII,S$GLB,, | Performed by: STUDENT IN AN ORGANIZED HEALTH CARE EDUCATION/TRAINING PROGRAM

## 2024-07-17 PROCEDURE — 85025 COMPLETE CBC W/AUTO DIFF WBC: CPT | Performed by: STUDENT IN AN ORGANIZED HEALTH CARE EDUCATION/TRAINING PROGRAM

## 2024-07-17 PROCEDURE — 99999 PR PBB SHADOW E&M-EST. PATIENT-LVL IV: CPT | Mod: PBBFAC,,, | Performed by: STUDENT IN AN ORGANIZED HEALTH CARE EDUCATION/TRAINING PROGRAM

## 2024-07-17 PROCEDURE — 82525 ASSAY OF COPPER: CPT | Performed by: STUDENT IN AN ORGANIZED HEALTH CARE EDUCATION/TRAINING PROGRAM

## 2024-07-17 PROCEDURE — 82746 ASSAY OF FOLIC ACID SERUM: CPT | Performed by: STUDENT IN AN ORGANIZED HEALTH CARE EDUCATION/TRAINING PROGRAM

## 2024-07-17 PROCEDURE — 3008F BODY MASS INDEX DOCD: CPT | Mod: CPTII,S$GLB,, | Performed by: STUDENT IN AN ORGANIZED HEALTH CARE EDUCATION/TRAINING PROGRAM

## 2024-07-17 PROCEDURE — 83615 LACTATE (LD) (LDH) ENZYME: CPT | Performed by: STUDENT IN AN ORGANIZED HEALTH CARE EDUCATION/TRAINING PROGRAM

## 2024-07-17 PROCEDURE — 82607 VITAMIN B-12: CPT | Performed by: STUDENT IN AN ORGANIZED HEALTH CARE EDUCATION/TRAINING PROGRAM

## 2024-07-17 PROCEDURE — 84443 ASSAY THYROID STIM HORMONE: CPT | Performed by: STUDENT IN AN ORGANIZED HEALTH CARE EDUCATION/TRAINING PROGRAM

## 2024-07-17 PROCEDURE — 82784 ASSAY IGA/IGD/IGG/IGM EACH: CPT | Performed by: STUDENT IN AN ORGANIZED HEALTH CARE EDUCATION/TRAINING PROGRAM

## 2024-07-17 PROCEDURE — 80053 COMPREHEN METABOLIC PANEL: CPT | Performed by: STUDENT IN AN ORGANIZED HEALTH CARE EDUCATION/TRAINING PROGRAM

## 2024-07-17 PROCEDURE — 82728 ASSAY OF FERRITIN: CPT | Performed by: STUDENT IN AN ORGANIZED HEALTH CARE EDUCATION/TRAINING PROGRAM

## 2024-07-17 PROCEDURE — 83540 ASSAY OF IRON: CPT | Performed by: STUDENT IN AN ORGANIZED HEALTH CARE EDUCATION/TRAINING PROGRAM

## 2024-07-17 PROCEDURE — 3044F HG A1C LEVEL LT 7.0%: CPT | Mod: CPTII,S$GLB,, | Performed by: STUDENT IN AN ORGANIZED HEALTH CARE EDUCATION/TRAINING PROGRAM

## 2024-07-17 PROCEDURE — 83521 IG LIGHT CHAINS FREE EACH: CPT | Performed by: STUDENT IN AN ORGANIZED HEALTH CARE EDUCATION/TRAINING PROGRAM

## 2024-07-17 PROCEDURE — 86334 IMMUNOFIX E-PHORESIS SERUM: CPT | Performed by: STUDENT IN AN ORGANIZED HEALTH CARE EDUCATION/TRAINING PROGRAM

## 2024-07-17 PROCEDURE — 36415 COLL VENOUS BLD VENIPUNCTURE: CPT | Performed by: STUDENT IN AN ORGANIZED HEALTH CARE EDUCATION/TRAINING PROGRAM

## 2024-07-17 PROCEDURE — 85045 AUTOMATED RETICULOCYTE COUNT: CPT | Performed by: STUDENT IN AN ORGANIZED HEALTH CARE EDUCATION/TRAINING PROGRAM

## 2024-07-18 LAB
ALBUMIN % SPEP (OHS): 55.86 (ref 48.1–59.5)
ALBUMIN SERPL-MCNC: 3.9 G/DL (ref 3.5–5)
ALBUMIN/GLOB SERPL: 1.3 RATIO (ref 1.1–2)
ALPHA 1 GLOB (OHS): 0.18 GM/DL (ref 0–0.4)
ALPHA 1 GLOB% (OHS): 2.59 (ref 2.3–4.9)
ALPHA 2 GLOB % (OHS): 6.49 (ref 6.9–13)
ALPHA 2 GLOB (OHS): 0.45 GM/DL (ref 0.4–1)
BETA GLOB (OHS): 1.05 GM/DL (ref 0.7–1.3)
BETA GLOB% (OHS): 15.01 (ref 13.8–19.7)
COPPER SERPL-MCNC: 92 MCG/DL (ref 73–129)
GAMMA GLOBULIN % (OHS): 20.05 (ref 10.1–21.9)
GAMMA GLOBULIN (OHS): 1.4 GM/DL (ref 0.4–1.8)
GLOBULIN SER-MCNC: 3.1 GM/DL (ref 2.4–3.5)
HEMATOLOGIST REVIEW: NORMAL
M SPIKE % (OHS): ABNORMAL
M SPIKE (OHS): ABNORMAL
PATH REV: NORMAL
PROT SERPL-MCNC: 7 GM/DL (ref 6.4–8.3)

## 2024-07-19 LAB
KAPPA LC FREE SER NEPH-MCNC: 1.43 MG/DL (ref 0.33–1.94)
KAPPA LC FREE/LAMBDA FREE SER NEPH: 1.68 {RATIO} (ref 0.26–1.65)
LAMBDA LC FREE SER NEPH-MCNC: 0.85 MG/DL (ref 0.57–2.63)

## 2024-07-25 NOTE — PROGRESS NOTES
Subjective:       Patient ID: Wei Welch is a 34 y.o. male.    Chief Complaint: Follow up    Diagnosis: Hull-null neutropenia    Current Treatment: None    Treatment History: N/A    HPI:   33 yo M presented in July '24 for evaluation of leukopenia. On 2 labs, one in July '23 and another in July '24 patient was found on annual visit with PCP to have asymptomatic leukopenia with WBC 2.6 and 2.7.  and 1000 respectively. Hemoglobin and platelets were normal. CMP unremarkable. He presents to hematology for further evaluation.     Today he has no complaints. He denies any smoking or heavy alcohol use. He eats a normal diet without excessive food restriction. Does not take any medications. Ranjan any history of frequent infections, hospitalizations, or antibiotic use. Denies any fevers, chills, NS, unintentional weight loss, fatigue.     Hematology workup was done which revealed no evidence of nutritional abnormalities, thyroid dysfunction, abnormal cell morphology, and malignancy. 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 Discussed with patient and his wife.     Interval History:  Patient presents to clinic today for MD follow up appointment and labs to discuss results and plan of treatment.  He states he is feeling good today.  Discussed labs in detail with patient and his spouse.  Denies any recurrent infections, fevers, chills or NS.  His appetite and energy level are both stable.   Overall, he has no major complaints or concerns today.    History reviewed. No pertinent past medical history.   History reviewed. No pertinent surgical history.  Social History     Socioeconomic History    Marital status:     Number of children: 1   Occupational History    Occupation:    Tobacco Use    Smoking status: Never    Smokeless tobacco: Never    Substance and Sexual Activity    Alcohol use: Yes     Alcohol/week: 3.0 standard drinks of alcohol     Types: 1 Glasses of wine, 1 Cans of beer, 1 Shots of liquor per week     Comment: 2-3 times a week    Drug use: Yes     Types: Marijuana      Family History   Problem Relation Name Age of Onset    Mental illness Mother Avni Rueda     No Known Problems Father      Vision loss Maternal Grandmother Sherrell Welch       Review of patient's allergies indicates:  No Known Allergies   Review of Systems   Constitutional:  Negative for activity change, appetite change, chills, fatigue, fever and unexpected weight change.   HENT:  Negative for mouth sores and sore throat.    Eyes:  Negative for visual disturbance.   Respiratory:  Negative for cough and shortness of breath.    Cardiovascular:  Negative for chest pain.   Gastrointestinal:  Negative for abdominal pain, constipation, diarrhea, nausea and vomiting.   Endocrine: Negative for polyuria.   Genitourinary:  Negative for dysuria and frequency.   Musculoskeletal:  Negative for back pain.   Integumentary:  Negative for rash.   Allergic/Immunologic: Negative for frequent infections.   Neurological:  Negative for weakness and headaches.   Hematological:  Negative for adenopathy. Does not bruise/bleed easily.   Psychiatric/Behavioral:  The patient is not nervous/anxious.          Objective:      Vitals:    07/30/24 0904   BP: 128/84   Pulse: 67   Resp: 18   Temp: 97.4 °F (36.3 °C)         Physical Exam  Constitutional:       General: He is not in acute distress.     Appearance: Normal appearance. He is not ill-appearing.   HENT:      Head: Normocephalic and atraumatic.      Nose: Nose normal.      Mouth/Throat:      Mouth: Mucous membranes are moist.      Pharynx: Oropharynx is clear.   Eyes:      Extraocular Movements: Extraocular movements intact.      Conjunctiva/sclera: Conjunctivae normal.      Pupils: Pupils are equal, round, and reactive to light.   Cardiovascular:       Rate and Rhythm: Normal rate and regular rhythm.      Pulses: Normal pulses.      Heart sounds: Normal heart sounds. No murmur heard.  Pulmonary:      Effort: Pulmonary effort is normal. No respiratory distress.      Breath sounds: Normal breath sounds.   Abdominal:      General: There is no distension.      Palpations: Abdomen is soft.      Tenderness: There is no abdominal tenderness.   Musculoskeletal:         General: Normal range of motion.      Cervical back: Normal range of motion and neck supple.      Right lower leg: No edema.      Left lower leg: No edema.   Lymphadenopathy:      Cervical: No cervical adenopathy.   Skin:     General: Skin is warm and dry.   Neurological:      General: No focal deficit present.      Mental Status: He is alert and oriented to person, place, and time.         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 This does not require further workup or treatment. It is a benign findings from birth seen in up to 40% of the AA population. He is not immunocompromised and can mount an appropriate immune response when necessary.       Plan:   - Discussed lab results in detail, explained disease, precautions, and education  - RTC PRN    I spent a total of 35 minutes on the day of the visit.This includes face to face time and non-face to face time  preparing to see the patient (eg, review of tests), obtaining and/or reviewing separately obtained history, documenting clinical information in the electronic or other health record, independently interpreting results and communicating results to the patient/family/caregiver, or care coordinator.      Elizabeth Kennedy LeJeune, MD  Hematology/Oncology   [facility]        Professional Services   I, Cheri Carballo LPN, acted solely as a scribe for and in the presence of Dr. Elizabeth Kennedy LeJeune, who performed these services.

## 2024-07-30 ENCOUNTER — OFFICE VISIT (OUTPATIENT)
Dept: HEMATOLOGY/ONCOLOGY | Facility: CLINIC | Age: 34
End: 2024-07-30
Payer: COMMERCIAL

## 2024-07-30 ENCOUNTER — LAB VISIT (OUTPATIENT)
Dept: LAB | Facility: HOSPITAL | Age: 34
End: 2024-07-30
Attending: STUDENT IN AN ORGANIZED HEALTH CARE EDUCATION/TRAINING PROGRAM
Payer: COMMERCIAL

## 2024-07-30 VITALS
BODY MASS INDEX: 28.39 KG/M2 | SYSTOLIC BLOOD PRESSURE: 128 MMHG | WEIGHT: 202.81 LBS | RESPIRATION RATE: 18 BRPM | HEART RATE: 67 BPM | TEMPERATURE: 97 F | OXYGEN SATURATION: 99 % | HEIGHT: 71 IN | DIASTOLIC BLOOD PRESSURE: 84 MMHG

## 2024-07-30 DIAGNOSIS — D70.8 BENIGN ETHNIC NEUTROPENIA: Primary | ICD-10-CM

## 2024-07-30 DIAGNOSIS — D72.819 LEUKOPENIA, UNSPECIFIED TYPE: ICD-10-CM

## 2024-07-30 PROBLEM — Z67.A1 BENIGN ETHNIC NEUTROPENIA: Status: ACTIVE | Noted: 2024-07-30

## 2024-07-30 LAB
ALBUMIN SERPL-MCNC: 4.4 G/DL (ref 3.5–5)
ALBUMIN/GLOB SERPL: 1.4 RATIO (ref 1.1–2)
ALP SERPL-CCNC: 61 UNIT/L (ref 40–150)
ALT SERPL-CCNC: 17 UNIT/L (ref 0–55)
ANION GAP SERPL CALC-SCNC: 6 MEQ/L
AST SERPL-CCNC: 18 UNIT/L (ref 5–34)
BASOPHILS # BLD AUTO: 0.03 X10(3)/MCL
BASOPHILS NFR BLD AUTO: 1 %
BILIRUB SERPL-MCNC: 1 MG/DL
BUN SERPL-MCNC: 27.9 MG/DL (ref 8.9–20.6)
CALCIUM SERPL-MCNC: 10.1 MG/DL (ref 8.4–10.2)
CHLORIDE SERPL-SCNC: 107 MMOL/L (ref 98–107)
CO2 SERPL-SCNC: 29 MMOL/L (ref 22–29)
CREAT SERPL-MCNC: 1.26 MG/DL (ref 0.73–1.18)
CREAT/UREA NIT SERPL: 22
EOSINOPHIL # BLD AUTO: 0.2 X10(3)/MCL (ref 0–0.9)
EOSINOPHIL NFR BLD AUTO: 6.4 %
ERYTHROCYTE [DISTWIDTH] IN BLOOD BY AUTOMATED COUNT: 11.8 % (ref 11.5–17)
GFR SERPLBLD CREATININE-BSD FMLA CKD-EPI: >60 ML/MIN/1.73/M2
GLOBULIN SER-MCNC: 3.1 GM/DL (ref 2.4–3.5)
GLUCOSE SERPL-MCNC: 88 MG/DL (ref 74–100)
HCT VFR BLD AUTO: 44.2 % (ref 42–52)
HGB BLD-MCNC: 15 G/DL (ref 14–18)
IMM GRANULOCYTES # BLD AUTO: 0 X10(3)/MCL (ref 0–0.04)
IMM GRANULOCYTES NFR BLD AUTO: 0 %
LYMPHOCYTES # BLD AUTO: 1.64 X10(3)/MCL (ref 0.6–4.6)
LYMPHOCYTES NFR BLD AUTO: 52.6 %
MCH RBC QN AUTO: 31 PG (ref 27–31)
MCHC RBC AUTO-ENTMCNC: 33.9 G/DL (ref 33–36)
MCV RBC AUTO: 91.3 FL (ref 80–94)
MONOCYTES # BLD AUTO: 0.3 X10(3)/MCL (ref 0.1–1.3)
MONOCYTES NFR BLD AUTO: 9.6 %
NEUTROPHILS # BLD AUTO: 0.95 X10(3)/MCL (ref 2.1–9.2)
NEUTROPHILS NFR BLD AUTO: 30.4 %
PLATELET # BLD AUTO: 165 X10(3)/MCL (ref 130–400)
PMV BLD AUTO: 11.6 FL (ref 7.4–10.4)
POTASSIUM SERPL-SCNC: 4.7 MMOL/L (ref 3.5–5.1)
PROT SERPL-MCNC: 7.5 GM/DL (ref 6.4–8.3)
RBC # BLD AUTO: 4.84 X10(6)/MCL (ref 4.7–6.1)
SODIUM SERPL-SCNC: 142 MMOL/L (ref 136–145)
WBC # BLD AUTO: 3.12 X10(3)/MCL (ref 4.5–11.5)

## 2024-07-30 PROCEDURE — 1160F RVW MEDS BY RX/DR IN RCRD: CPT | Mod: CPTII,S$GLB,, | Performed by: STUDENT IN AN ORGANIZED HEALTH CARE EDUCATION/TRAINING PROGRAM

## 2024-07-30 PROCEDURE — 36415 COLL VENOUS BLD VENIPUNCTURE: CPT

## 2024-07-30 PROCEDURE — 99214 OFFICE O/P EST MOD 30 MIN: CPT | Mod: S$GLB,,, | Performed by: STUDENT IN AN ORGANIZED HEALTH CARE EDUCATION/TRAINING PROGRAM

## 2024-07-30 PROCEDURE — 3044F HG A1C LEVEL LT 7.0%: CPT | Mod: CPTII,S$GLB,, | Performed by: STUDENT IN AN ORGANIZED HEALTH CARE EDUCATION/TRAINING PROGRAM

## 2024-07-30 PROCEDURE — 3008F BODY MASS INDEX DOCD: CPT | Mod: CPTII,S$GLB,, | Performed by: STUDENT IN AN ORGANIZED HEALTH CARE EDUCATION/TRAINING PROGRAM

## 2024-07-30 PROCEDURE — 3074F SYST BP LT 130 MM HG: CPT | Mod: CPTII,S$GLB,, | Performed by: STUDENT IN AN ORGANIZED HEALTH CARE EDUCATION/TRAINING PROGRAM

## 2024-07-30 PROCEDURE — 85025 COMPLETE CBC W/AUTO DIFF WBC: CPT

## 2024-07-30 PROCEDURE — 80053 COMPREHEN METABOLIC PANEL: CPT

## 2024-07-30 PROCEDURE — 1159F MED LIST DOCD IN RCRD: CPT | Mod: CPTII,S$GLB,, | Performed by: STUDENT IN AN ORGANIZED HEALTH CARE EDUCATION/TRAINING PROGRAM

## 2024-07-30 PROCEDURE — 3079F DIAST BP 80-89 MM HG: CPT | Mod: CPTII,S$GLB,, | Performed by: STUDENT IN AN ORGANIZED HEALTH CARE EDUCATION/TRAINING PROGRAM

## 2024-07-30 PROCEDURE — 99999 PR PBB SHADOW E&M-EST. PATIENT-LVL III: CPT | Mod: PBBFAC,,, | Performed by: STUDENT IN AN ORGANIZED HEALTH CARE EDUCATION/TRAINING PROGRAM

## 2024-10-14 PROBLEM — Z00.00 WELLNESS EXAMINATION: Status: RESOLVED | Noted: 2023-07-07 | Resolved: 2024-10-14

## 2025-01-07 ENCOUNTER — OFFICE VISIT (OUTPATIENT)
Dept: URGENT CARE | Facility: CLINIC | Age: 35
End: 2025-01-07

## 2025-01-07 VITALS
TEMPERATURE: 98 F | BODY MASS INDEX: 28.28 KG/M2 | HEART RATE: 74 BPM | WEIGHT: 202 LBS | HEIGHT: 71 IN | OXYGEN SATURATION: 100 % | SYSTOLIC BLOOD PRESSURE: 144 MMHG | DIASTOLIC BLOOD PRESSURE: 87 MMHG | RESPIRATION RATE: 18 BRPM

## 2025-01-07 DIAGNOSIS — Z20.2 EXPOSURE TO STD: Primary | ICD-10-CM

## 2025-01-07 LAB
BILIRUB UR QL STRIP: NEGATIVE
GLUCOSE UR QL STRIP: NEGATIVE
KETONES UR QL STRIP: NEGATIVE
LEUKOCYTE ESTERASE UR QL STRIP: NEGATIVE
PH, POC UA: 6.5
POC BLOOD, URINE: NEGATIVE
POC NITRATES, URINE: NEGATIVE
PROT UR QL STRIP: NEGATIVE
SP GR UR STRIP: 1.01 (ref 1–1.03)
UROBILINOGEN UR STRIP-ACNC: NORMAL (ref 0.3–2.2)

## 2025-01-07 PROCEDURE — 81003 URINALYSIS AUTO W/O SCOPE: CPT | Mod: QW,,, | Performed by: NURSE PRACTITIONER

## 2025-01-07 PROCEDURE — 87661 TRICHOMONAS VAGINALIS AMPLIF: CPT | Performed by: NURSE PRACTITIONER

## 2025-01-07 PROCEDURE — 87591 N.GONORRHOEAE DNA AMP PROB: CPT | Performed by: NURSE PRACTITIONER

## 2025-01-07 PROCEDURE — 99213 OFFICE O/P EST LOW 20 MIN: CPT | Mod: ,,, | Performed by: NURSE PRACTITIONER

## 2025-01-07 RX ORDER — AZITHROMYCIN 250 MG/1
1000 TABLET, FILM COATED ORAL ONCE
Qty: 4 TABLET | Refills: 0 | Status: SHIPPED | OUTPATIENT
Start: 2025-01-07 | End: 2025-01-07

## 2025-01-07 NOTE — PROGRESS NOTES
"Subjective:      Patient ID: Wei Welch is a 34 y.o. male.    Vitals:  height is 5' 11" (1.803 m) and weight is 91.6 kg (202 lb). His temperature is 97.9 °F (36.6 °C). His blood pressure is 144/87 (abnormal) and his pulse is 74. His respiration is 18 and oxygen saturation is 100%.     Chief Complaint: Exposure to STD    34 y.o male presents to clinic for std testing. Possible to exposure to chlamydia.  Patient states he was called by a girlfriend and was told that he was exposed to chlamydia and he should get treated by a physician.  Patient states he started with discomfort when urinating starting yesterday.  Patient denies any penile discharge, testicular pain, genital sore, back pain, fever, abdominal pain.      Constitution: Negative. Negative for fever.   HENT: Negative.     Neck: neck negative.   Cardiovascular: Negative.    Eyes: Negative.    Respiratory: Negative.     Gastrointestinal: Negative.  Negative for abdominal pain.   Endocrine: negative.   Genitourinary:  Positive for dysuria. Negative for genital sore, penile discharge, painful ejaculation, penile pain, penile swelling, scrotal swelling and testicular pain.   Musculoskeletal: Negative.  Negative for back pain.   Skin: Negative.    Allergic/Immunologic: Negative.    Neurological: Negative.    Hematologic/Lymphatic: Negative.    Psychiatric/Behavioral: Negative.        Objective:     Physical Exam   Constitutional: He is oriented to person, place, and time. He appears well-developed. He is cooperative.   HENT:   Head: Normocephalic and atraumatic.   Ears:   Right Ear: Hearing, tympanic membrane, external ear and ear canal normal.   Left Ear: Hearing, tympanic membrane, external ear and ear canal normal.   Nose: Nose normal. No mucosal edema or nasal deformity. No epistaxis. Right sinus exhibits no maxillary sinus tenderness and no frontal sinus tenderness. Left sinus exhibits no maxillary sinus tenderness and no frontal sinus tenderness. " "  Mouth/Throat: Uvula is midline, oropharynx is clear and moist and mucous membranes are normal. Mucous membranes are moist. No trismus in the jaw. Normal dentition. No uvula swelling.   Eyes: Conjunctivae and lids are normal.   Neck: Trachea normal and phonation normal. Neck supple.   Cardiovascular: Normal rate, regular rhythm, normal heart sounds and normal pulses.   Pulmonary/Chest: Effort normal and breath sounds normal.   Abdominal: Normal appearance and bowel sounds are normal. Soft.   Musculoskeletal: Normal range of motion.         General: Normal range of motion.   Neurological: He is alert and oriented to person, place, and time. He exhibits normal muscle tone.   Skin: Skin is warm, dry and intact.   Psychiatric: His speech is normal and behavior is normal. Judgment and thought content normal.   Nursing note and vitals reviewed.         Previous History      Review of patient's allergies indicates:  No Known Allergies    History reviewed. No pertinent past medical history.  Current Outpatient Medications   Medication Instructions    azithromycin (Z-MARY) 1,000 mg, Oral, Once, Take 1000 mg once PO     History reviewed. No pertinent surgical history.  Family History   Problem Relation Name Age of Onset    Mental illness Mother Avni Rueda     No Known Problems Father      Vision loss Maternal Grandmother Sherrell Welch        Social History     Tobacco Use    Smoking status: Never    Smokeless tobacco: Never   Substance Use Topics    Alcohol use: Yes     Alcohol/week: 3.0 standard drinks of alcohol     Types: 1 Glasses of wine, 1 Cans of beer, 1 Shots of liquor per week     Comment: 2-3 times a week    Drug use: Yes     Types: Marijuana        Physical Exam      Vital Signs Reviewed   BP (!) 144/87   Pulse 74   Temp 97.9 °F (36.6 °C)   Resp 18   Ht 5' 11" (1.803 m)   Wt 91.6 kg (202 lb)   SpO2 100%   BMI 28.17 kg/m²        Procedures    Procedures     Labs     Results for orders placed or performed in " visit on 01/07/25   POCT Urinalysis, Dipstick, Automated, W/O Scope    Collection Time: 01/07/25  4:33 PM   Result Value Ref Range    POC Blood, Urine Negative Negative    POC Bilirubin, Urine Negative Negative    POC Urobilinogen, Urine norm 0.3 - 2.2    POC Ketones, Urine Negative Negative    POC Protein, Urine Negative Negative    POC Nitrates, Urine Negative Negative    POC Glucose, Urine Negative Negative    pH, UA 6.5     POC Specific Gravity, Urine 1.010 1.003 - 1.029    POC Leukocytes, Urine Negative Negative     Assessment:     1. Exposure to STD        Plan:   A sexually transmitted disease (STD) means signs or symptoms of a sexually transmitted infection (STI) have developed. An STI happens when bacteria or a virus are spread through oral, genital, or anal sex. Some examples of STDs are HIV, chlamydia, syphilis, and gonorrhea.    DISCHARGE INSTRUCTIONS:    Return to the emergency department if:  You have genital swelling or pain, or unusual bleeding.  You have joint pain, a rash, swollen lymph nodes, or night sweats.  You have severe abdominal pain.    Call your doctor if:    You have a fever.  Your symptoms do not go away or get worse, even after treatment.  You have bleeding or pain during sex.  You or your female partner may be pregnant.  You have questions or concerns about your condition or care.  Medicines:    You may need any of the following:    Antibiotics may be given for a bacterial infection.  Antivirals may be given for a viral infection.  Antifungals may be given for a fungal infection, such as a yeast infection.    Take your medicine as directed. Contact your healthcare provider if you think your medicine is not helping or if you have side effects. Tell your provider if you are allergic to any medicine. Keep a list of the medicines, vitamins, and herbs you take. Include the amounts, and when and why you take them. Bring the list or the pill bottles to follow-up visits. Carry your medicine  list with you in case of an emergency.    Help prevent the spread of an STI:    Ask your healthcare provider for more information about the following safe sex practices:    Use a male or female condom during sex. This includes oral, genital, or anal sex. Use a new condom each time. Condoms help prevent pregnancy and STIs. Use latex condoms, if possible. Lambskin (also called sheepskin or natural membrane) condoms do not protect against STIs. A polyurethane condom can be used if you or your partner is allergic to latex. Condoms should be used with a second form of birth control to help prevent pregnancy and STIs. Do not use male and female condoms together.    Do not have sex with someone who has an STI or STD. This includes oral and anal sex.  Limit sex partners. Ask about your partner's sex history before you have sex.  Get screened regularly if you are sexually active. Common tests include chlamydia, gonorrhea, HIV, hepatitis, and syphilis.  Tell your sex partners if you have an STI. Your partners may need to be tested and treated. Do not have sex while you are being treated for an STI. Do not have sex with a partner who is being treated.    Ask about medicines to lower your risk for some STIs:    Vaccines can help protect you from hepatitis A, hepatitis B, and the human papillomavirus (HPV). The HPV vaccine is usually given at 11 years, but it may be given through 26 years to both females and males. Your provider can give you more information on vaccines to prevent STIs.    Pre-exposure prophylaxis (PrEP) may be given if you are at high risk for HIV. PrEP is taken every day to prevent the virus from fully infecting the body.    If you are a woman, do not douche. Douching upsets the normal balance of bacteria found in your vagina. It does not prevent or clear up vaginal infections.     Exposure to STD  -     Chlamydia/GC, PCR  -     Trichomonas vaginalis Amplified RNA  -     POCT Urinalysis, Dipstick, Automated,  W/O Scope  -     azithromycin (Z-MARY) 250 MG tablet; Take 4 tablets (1,000 mg total) by mouth once. Take 1000 mg once PO for 1 dose  Dispense: 4 tablet; Refill: 0

## 2025-01-07 NOTE — PATIENT INSTRUCTIONS
A sexually transmitted disease (STD) means signs or symptoms of a sexually transmitted infection (STI) have developed. An STI happens when bacteria or a virus are spread through oral, genital, or anal sex. Some examples of STDs are HIV, chlamydia, syphilis, and gonorrhea.    DISCHARGE INSTRUCTIONS:    Return to the emergency department if:  You have genital swelling or pain, or unusual bleeding.  You have joint pain, a rash, swollen lymph nodes, or night sweats.  You have severe abdominal pain.    Call your doctor if:    You have a fever.  Your symptoms do not go away or get worse, even after treatment.  You have bleeding or pain during sex.  You or your female partner may be pregnant.  You have questions or concerns about your condition or care.  Medicines:    You may need any of the following:    Antibiotics may be given for a bacterial infection.  Antivirals may be given for a viral infection.  Antifungals may be given for a fungal infection, such as a yeast infection.    Take your medicine as directed. Contact your healthcare provider if you think your medicine is not helping or if you have side effects. Tell your provider if you are allergic to any medicine. Keep a list of the medicines, vitamins, and herbs you take. Include the amounts, and when and why you take them. Bring the list or the pill bottles to follow-up visits. Carry your medicine list with you in case of an emergency.    Help prevent the spread of an STI:    Ask your healthcare provider for more information about the following safe sex practices:    Use a male or female condom during sex. This includes oral, genital, or anal sex. Use a new condom each time. Condoms help prevent pregnancy and STIs. Use latex condoms, if possible. Lambskin (also called sheepskin or natural membrane) condoms do not protect against STIs. A polyurethane condom can be used if you or your partner is allergic to latex. Condoms should be used with a second form of birth  control to help prevent pregnancy and STIs. Do not use male and female condoms together.    Do not have sex with someone who has an STI or STD. This includes oral and anal sex.  Limit sex partners. Ask about your partner's sex history before you have sex.  Get screened regularly if you are sexually active. Common tests include chlamydia, gonorrhea, HIV, hepatitis, and syphilis.  Tell your sex partners if you have an STI. Your partners may need to be tested and treated. Do not have sex while you are being treated for an STI. Do not have sex with a partner who is being treated.    Ask about medicines to lower your risk for some STIs:    Vaccines can help protect you from hepatitis A, hepatitis B, and the human papillomavirus (HPV). The HPV vaccine is usually given at 11 years, but it may be given through 26 years to both females and males. Your provider can give you more information on vaccines to prevent STIs.    Pre-exposure prophylaxis (PrEP) may be given if you are at high risk for HIV. PrEP is taken every day to prevent the virus from fully infecting the body.    If you are a woman, do not douche. Douching upsets the normal balance of bacteria found in your vagina. It does not prevent or clear up vaginal infections.

## 2025-01-09 LAB
C TRACH RRNA SPEC QL NAA+PROBE: POSITIVE
N GONORRHOEA RRNA SPEC QL NAA+PROBE: NEGATIVE
SPECIMEN SOURCE: ABNORMAL
SPECIMEN SOURCE: ABNORMAL
SPECIMEN SOURCE: NORMAL
T VAGINALIS RRNA SPEC QL NAA+PROBE: NEGATIVE

## 2025-07-15 ENCOUNTER — TELEPHONE (OUTPATIENT)
Dept: FAMILY MEDICINE | Facility: CLINIC | Age: 35
End: 2025-07-15